# Patient Record
(demographics unavailable — no encounter records)

---

## 2024-12-09 NOTE — PHYSICAL EXAM
[de-identified] : b/l erickan  [Normal] : mucosa is normal [Midline] : trachea located in midline position

## 2024-12-09 NOTE — HISTORY OF PRESENT ILLNESS
[de-identified] : 39 year old female presents for b/l clogged ears. Today she is here for f/u for her ear cleaning. She has itchy ears. She denies any ear pain, drainage, tinnitus or change in hearing. She has no other ENT complaints

## 2025-05-15 NOTE — HISTORY OF PRESENT ILLNESS
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, [facility] obesity, left lumbar radiculopathy, GERD, pernicious anemia presents for annual exam  sore throat/mucus 2/2 GERD when eats spicy food [facility] GI Dr Westbrook - last egd 2021 uses PPI sparingly denies dark stool, weight loss, abdominal pain  hematology Dr Johnson for incidental leukopenia - unremarkable workup  no complaints  5.6.24: explained pathophysiology of pernicious anemia pt declined high dose oral B12 - will come to office for monthly B12 injection  trying to lose weight but chronic stress caring for partner   5.12.25: started PPI, GERD symptoms resolved last egd 2022 - normal, as per pt - outside GI Dr Rose  no complaints

## 2025-05-15 NOTE — HEALTH RISK ASSESSMENT
[No] : In the past 12 months have you used drugs other than those required for medical reasons? No [0] : 2) Feeling down, depressed, or hopeless: Not at all (0) [[id number]] : 0 [Yes] : Reviewed medication list for presence of high-risk medications. [Opioids] : opioids [Never] : Never [Patient reported PAP Smear was normal] : Patient reported PAP Smear was normal [Change in mental status noted] : No change in mental status noted [None] : None [Fully functional (bathing, dressing, toileting, transferring, walking, feeding)] : Fully functional (bathing, dressing, toileting, transferring, walking, feeding) [Fully functional (using the telephone, shopping, preparing meals, housekeeping, doing laundry, using] : Fully functional and needs no help or supervision to perform IADLs (using the telephone, shopping, preparing meals, housekeeping, doing laundry, using transportation, managing medications and managing finances) [Reports changes in hearing] : Reports no changes in hearing [Reports changes in vision] : Reports no changes in vision [Reports changes in dental health] : Reports no changes in dental health [MammogramDate] : due [PapSmearDate] : 2012 [PapSmearComments] : due [Reviewed no changes] : Reviewed, no changes [I will adhere to the patient's wishes.] : I will adhere to the patient's wishes.

## 2025-06-02 NOTE — END OF VISIT
[FreeTextEntry3] : I personally saw and examined Ms. GEM CAGE in detail this visit today. I personally reviewed the HPI, PMH, FH, SH, ROS and medications/allergies. I have spoken to UYEN Siddiqui regarding the history and have personally determined the assessment and plan of care, and documented this myself. I was present and participated in all key portions of the encounter and all procedures noted above. I have made changes in the body of the note where appropriate.   Attesting Faculty: See Attending Signature Below

## 2025-06-02 NOTE — HISTORY OF PRESENT ILLNESS
[de-identified] : 40 year old female presents for b/l clogged ears. Today she is here for f/u for her ear cleaning.  She denies any ear pain, drainage, tinnitus or change in hearing. She has no other ENT complaints